# Patient Record
Sex: FEMALE | Race: WHITE | NOT HISPANIC OR LATINO | Employment: UNEMPLOYED | URBAN - METROPOLITAN AREA
[De-identification: names, ages, dates, MRNs, and addresses within clinical notes are randomized per-mention and may not be internally consistent; named-entity substitution may affect disease eponyms.]

---

## 2017-03-17 ENCOUNTER — ALLSCRIPTS OFFICE VISIT (OUTPATIENT)
Dept: OTHER | Facility: OTHER | Age: 37
End: 2017-03-17

## 2017-03-17 DIAGNOSIS — N93.9 ABNORMAL UTERINE AND VAGINAL BLEEDING, UNSPECIFIED: ICD-10-CM

## 2017-04-10 ENCOUNTER — ALLSCRIPTS OFFICE VISIT (OUTPATIENT)
Dept: OTHER | Facility: OTHER | Age: 37
End: 2017-04-10

## 2017-04-14 ENCOUNTER — HOSPITAL ENCOUNTER (OUTPATIENT)
Dept: ULTRASOUND IMAGING | Facility: HOSPITAL | Age: 37
Discharge: HOME | End: 2017-04-14
Payer: COMMERCIAL

## 2017-04-14 DIAGNOSIS — N93.9 ABNORMAL UTERINE AND VAGINAL BLEEDING, UNSPECIFIED: ICD-10-CM

## 2017-04-14 PROCEDURE — 76856 US EXAM PELVIC COMPLETE: CPT

## 2017-04-14 PROCEDURE — 76830 TRANSVAGINAL US NON-OB: CPT

## 2017-04-18 ENCOUNTER — GENERIC CONVERSION - ENCOUNTER (OUTPATIENT)
Dept: OTHER | Facility: OTHER | Age: 37
End: 2017-04-18

## 2017-04-20 ENCOUNTER — ALLSCRIPTS OFFICE VISIT (OUTPATIENT)
Dept: OTHER | Facility: OTHER | Age: 37
End: 2017-04-20

## 2017-04-20 DIAGNOSIS — E66.01 MORBID (SEVERE) OBESITY DUE TO EXCESS CALORIES (HCC): ICD-10-CM

## 2017-05-17 ENCOUNTER — GENERIC CONVERSION - ENCOUNTER (OUTPATIENT)
Dept: OTHER | Facility: OTHER | Age: 37
End: 2017-05-17

## 2017-05-18 ENCOUNTER — GENERIC CONVERSION - ENCOUNTER (OUTPATIENT)
Dept: OTHER | Facility: OTHER | Age: 37
End: 2017-05-18

## 2018-01-11 NOTE — MISCELLANEOUS
Message  p c  to pt ie: pt was a N/S for this morning appt      Active Problems    1  Abnormal uterine bleeding (626 9) (N93 9)   2  Elevated blood pressure   3  Encounter for annual routine gynecological examination (V72 31) (Z01 419)   4  Encounter for preconception consultation (V26 49) (Z31 69)   5  Encounter for routine gynecological examination with Papanicolaou smear of cervix   (V72 31,V76 2) (Z01 419)   6  Late menstruation (626 8) (N92 6)   7  Morbid obesity with BMI of 50 0-59 9, adult (278 01,V85 43) (E66 01,Z68 43)   8  Screen for STD (sexually transmitted disease) (V74 5) (Z11 3)    Current Meds   1  No Reported Medications Recorded    Allergies    1  Erythromycin TABS   2   Penicillins    Signatures   Electronically signed by : Juilo Cesar Martin RN; May 18 2017  8:45AM EST                       (Author)

## 2018-01-13 VITALS
SYSTOLIC BLOOD PRESSURE: 129 MMHG | DIASTOLIC BLOOD PRESSURE: 85 MMHG | BODY MASS INDEX: 51.83 KG/M2 | HEART RATE: 83 BPM | WEIGHT: 293 LBS

## 2018-01-13 VITALS
WEIGHT: 293 LBS | BODY MASS INDEX: 47.09 KG/M2 | SYSTOLIC BLOOD PRESSURE: 134 MMHG | DIASTOLIC BLOOD PRESSURE: 84 MMHG | HEART RATE: 91 BPM | HEIGHT: 66 IN

## 2018-01-13 NOTE — MISCELLANEOUS
Message  patient called with results regarding Trichomonas  Patient instructed to take prescription as ordered, avoid alcohol and no unprotected sex until partner is treated  information on getting partner treated provided to patient      Active Problems    1  Elevated blood pressure (401 9) (I10)   2  Encounter for preconception consultation (V26 49) (Z31 69)   3  Encounter for routine gynecological examination with Papanicolaou smear of cervix   (V72 31,V76 2) (Z01 419)   4  Late menstruation (626 8) (N91 0)   5  Morbid obesity with body mass index of 50 0-59 9 in adult (278 01,V85 43)   (F09 43,H42 93)   6  Screen for STD (sexually transmitted disease) (V74 5) (Z11 3)   7  Trichomonal vulvovaginitis (131 01) (A59 01)    Current Meds   1  MetroNIDAZOLE 500 MG Oral Tablet; take 2gms once, use 500mg tablet; Therapy: [id number] to ([facility] Hanna)  Requested for: [id number]; Last   Rx:[id number] Ordered    Allergies    1   No Known Drug Allergies    Signatures   Electronically signed by : Miller Farnsworth, ; Apr 4 2016 11:12AM EST                       (Author)

## 2018-01-14 VITALS
WEIGHT: 293 LBS | HEIGHT: 66 IN | BODY MASS INDEX: 47.09 KG/M2 | SYSTOLIC BLOOD PRESSURE: 128 MMHG | DIASTOLIC BLOOD PRESSURE: 70 MMHG

## 2018-01-14 NOTE — RESULT NOTES
Message  Positive trichomonas on sureswab, Metronidazole 2 gms once ordered  Nurse to call to inform pt  and partner to be treated  Plan  Trichomonal vulvovaginitis    · MetroNIDAZOLE 500 MG Oral Tablet; take 2gms once, use 500mg tablet    Signatures   Electronically signed by : TONY Bowman;  Apr 4 2016 11:03AM EST                       (Author)

## 2018-01-15 NOTE — RESULT NOTES
Discussion/Summary  Pt has f/u appt with physicians on 4/20/2017     Verified Results  * US PELVIS COMPLETE [facility] AND TRANSVAGINAL) [id number] 04:49PM Suki Quiroga Order Number: [id number]    - Patient Instructions: To schedule this appointment, please contact Central Scheduling at [phone]  Test Name Result Flag Reference   US PELVIS COMPLETE (TRANSABDOMINAL AND TRANSVAGINAL) (Report)     PELVIC ULTRASOUND, COMPLETE     INDICATION: Irregular menses  Abnormal bleeding  LMP unsure  COMPARISON: None  TECHNIQUE:  Transabdominal pelvic ultrasound was performed in sagittal and transverse planes with a curvilinear transducer  Additional transvaginal imaging was performed to better evaluate the endometrium and ovaries  Imaging included volumetric    sweeps as well as traditional still imaging technique  FINDINGS:     UTERUS:   The uterus is anteverted in position, measuring 8 9 x 3 8 cm  Contour and echotexture appear normal      Large nabothian cyst measures 2 3 x 2 1 cm  ENDOMETRIUM:    Normal caliber of 8 mm  Homogenous and normal in appearance  OVARIES/ADNEXA:   Right ovary: 3 3 x 2 0 2 2 x 1 8 cm  No suspicious right ovarian abnormality  Doppler flow within normal limits  Left ovary:   cm  No suspicious left ovarian abnormality  Doppler flow within normal limits  No suspicious adnexal mass or loculated collections  There is no free fluid  IMPRESSION:      Large cystic structure in the lower uterine segment/upper cervix most consistent with a nabothian cyst measuring 2 3 x 2 1 cm  The endometrial cavity appears normal  It is unclear if this cyst is resulting in any obstruction of the cervical canal     Clinical correlation suggested and consider female pelvis MRI for any ambiguity  ##sigslh##sigslh              Workstation performed: [id number]     Signed by:    Shahbaz Wade MD   4/17/17       Signatures Electronically signed by : Dulce Brown, [facility];  Apr 18 2017 10:22AM EST                       (Author)

## 2018-01-17 NOTE — MISCELLANEOUS
Message  pt called with c/o" heavy vag blding" states has been blding for 10 days now , but for the past 4 days has been changing her tampon 10-12 times during the course of a day " pt instructed to call in am for same day appt  -to be evaluated for AUB  Active Problems    1  Abnormal uterine bleeding (626 9) (N93 9)   2  Elevated blood pressure   3  Encounter for annual routine gynecological examination (V72 31) (Z01 419)   4  Encounter for preconception consultation (V26 49) (Z31 69)   5  Encounter for routine gynecological examination with Papanicolaou smear of cervix   (V72 31,V76 2) (Z01 419)   6  Late menstruation (626 8) (N92 6)   7  Morbid obesity with BMI of 50 0-59 9, adult (278 01,V85 43) (E66 01,Z68 43)   8  Screen for STD (sexually transmitted disease) (V74 5) (Z11 3)    Current Meds   1  No Reported Medications Recorded    Allergies    1  Erythromycin TABS   2   Penicillins    Signatures   Electronically signed by : Lawrence Valderrama RN; May 17 2017  1:53PM EST                       (Author)

## 2019-01-01 NOTE — RESULT NOTES
Message  Positive for Trichomoniasis will treat with Metronidazole 2 gm one time dose, Her partner will need the treatment too, Tasked [facility] to call patient and inform her of the treatment and have her sexual partner treated as well, she will need to use protection till her partner is infection free to prevent reinfection  Verified Results  *(Q) SURESWAB(R), CT/NG, T VAGINALIS [id number] 12:00AM Agustín Ventura     Test Name Result Flag Reference   TRICHOMONAS VAGINALIS RNA$QUALITATIVE TMA DETECTED A NOT DETECTED   This test was performed using the APTIMA(R) Trichomonas  vaginalis assay ([employer]))  For more information on this test, go to:  [email or web address]/faq/[email or web address]   CHLAMYDIA TRACHOMATIS$RNA, TMA NOT DETECTED  NOT DETECTED   NEISSERIA GONORRHOEAE$RNA, TMA NOT DETECTED  NOT DETECTED   This test was performed using the APTIMA COMBO2 Assay  ([employer] )  The analytical performance characteristics of this   assay, when used to test SurePath specimens have  been determined by [employer]               Plan  Trichomoniasis    · MetroNIDAZOLE 500 MG Oral Tablet; TAKE 4 TABLETS ONCE    Signatures   Electronically signed by : Armen Hanson, ; Apr 13 2016  8:51PM EST                       (Author)
good with improved breath support and ongoing maturity